# Patient Record
Sex: FEMALE | Race: BLACK OR AFRICAN AMERICAN | NOT HISPANIC OR LATINO | Employment: UNEMPLOYED | URBAN - METROPOLITAN AREA
[De-identification: names, ages, dates, MRNs, and addresses within clinical notes are randomized per-mention and may not be internally consistent; named-entity substitution may affect disease eponyms.]

---
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
## 2022-06-22 PROBLEM — O09.30 LATE PRENATAL CARE: Status: RESOLVED | Noted: 2019-08-02 | Resolved: 2022-06-22

## 2022-06-22 PROBLEM — O99.810 ABNORMAL O'SULLIVAN GLUCOSE CHALLENGE TEST, ANTEPARTUM: Status: RESOLVED | Noted: 2019-11-01 | Resolved: 2022-06-22

## 2022-06-23 PROBLEM — O98.811 CHLAMYDIA INFECTION AFFECTING PREGNANCY IN FIRST TRIMESTER: Status: ACTIVE | Noted: 2022-06-23

## 2022-06-23 PROBLEM — A74.9 CHLAMYDIA INFECTION AFFECTING PREGNANCY IN FIRST TRIMESTER: Status: ACTIVE | Noted: 2022-06-23

## 2022-07-05 ENCOUNTER — SOCIAL WORK (OUTPATIENT)
Dept: ADMINISTRATIVE | Facility: OTHER | Age: 22
End: 2022-07-05

## 2022-07-05 NOTE — PROGRESS NOTES
SW met with pt regarding initial OB assessment. Pt stated this is her 2nd pregnancy/0-miscarriage. Pt stated lives with her boyfriend/child-2 and able to perform ADL's independently. Pt stated does work. Pt stated support system is her sister/Chery. Pt stated has [facility] Pt stated does not have WIC. Pt stated is going to breastfeed. SW provide pt with information on other community resources.SW faxed and scanned pt's notification of pregnancy into epic.  No other needs identified at this time.    MILA Colvin  Pager#[phone]

## 2022-07-13 ENCOUNTER — PATIENT MESSAGE (OUTPATIENT)
Dept: ADMINISTRATIVE | Facility: OTHER | Age: 22
End: 2022-07-13

## 2022-07-20 ENCOUNTER — PATIENT MESSAGE (OUTPATIENT)
Dept: ADMINISTRATIVE | Facility: OTHER | Age: 22
End: 2022-07-20

## 2022-10-07 PROBLEM — B35.4 TINEA CORPORIS: Status: ACTIVE | Noted: 2022-10-07

## 2022-12-28 PROBLEM — D50.9 IRON DEFICIENCY ANEMIA: Status: ACTIVE | Noted: 2022-12-28

## 2022-12-28 PROBLEM — D64.9 ANEMIA: Status: ACTIVE | Noted: 2022-12-28

## 2023-01-09 PROBLEM — O99.820 GBS (GROUP B STREPTOCOCCUS CARRIER), +RV CULTURE, CURRENTLY PREGNANT: Status: RESOLVED | Noted: 2023-01-08 | Resolved: 2023-01-09

## 2023-01-09 PROBLEM — O99.820 GBS (GROUP B STREPTOCOCCUS CARRIER), +RV CULTURE, CURRENTLY PREGNANT: Status: ACTIVE | Noted: 2023-01-08

## 2023-01-09 PROBLEM — B95.1 POSITIVE GBS TEST: Status: ACTIVE | Noted: 2023-01-09

## 2023-01-12 PROBLEM — J06.9 VIRAL UPPER RESPIRATORY TRACT INFECTION: Status: ACTIVE | Noted: 2023-01-12

## 2023-01-12 PROBLEM — O26.899 CARPAL TUNNEL SYNDROME DURING PREGNANCY: Status: ACTIVE | Noted: 2023-01-12

## 2023-01-12 PROBLEM — G56.00 CARPAL TUNNEL SYNDROME DURING PREGNANCY: Status: ACTIVE | Noted: 2023-01-12

## 2024-02-26 PROBLEM — O98.811 CHLAMYDIA INFECTION AFFECTING PREGNANCY IN FIRST TRIMESTER: Status: RESOLVED | Noted: 2022-06-23 | Resolved: 2024-02-26

## 2024-02-26 PROBLEM — J06.9 VIRAL UPPER RESPIRATORY TRACT INFECTION: Status: RESOLVED | Noted: 2023-01-12 | Resolved: 2024-02-26

## 2024-02-26 PROBLEM — A74.9 CHLAMYDIA INFECTION AFFECTING PREGNANCY IN FIRST TRIMESTER: Status: RESOLVED | Noted: 2022-06-23 | Resolved: 2024-02-26

## 2024-02-26 PROBLEM — D50.9 IRON DEFICIENCY ANEMIA: Status: RESOLVED | Noted: 2022-12-28 | Resolved: 2024-02-26

## 2024-02-26 PROBLEM — O26.899 CARPAL TUNNEL SYNDROME DURING PREGNANCY: Status: RESOLVED | Noted: 2023-01-12 | Resolved: 2024-02-26

## 2024-02-26 PROBLEM — G56.00 CARPAL TUNNEL SYNDROME DURING PREGNANCY: Status: RESOLVED | Noted: 2023-01-12 | Resolved: 2024-02-26

## 2024-02-26 PROBLEM — B35.4 TINEA CORPORIS: Status: RESOLVED | Noted: 2022-10-07 | Resolved: 2024-02-26
